# Patient Record
Sex: FEMALE | Race: WHITE | HISPANIC OR LATINO | Employment: OTHER | URBAN - METROPOLITAN AREA
[De-identification: names, ages, dates, MRNs, and addresses within clinical notes are randomized per-mention and may not be internally consistent; named-entity substitution may affect disease eponyms.]

---

## 2022-12-16 ENCOUNTER — HOSPITAL ENCOUNTER (OUTPATIENT)
Dept: GENERAL RADIOLOGY | Facility: HOSPITAL | Age: 67
Discharge: HOME | End: 2022-12-16

## 2022-12-16 ENCOUNTER — PRE-ADMISSION TESTING (OUTPATIENT)
Dept: PREADMISSION TESTING | Facility: HOSPITAL | Age: 67
End: 2022-12-16

## 2022-12-16 ENCOUNTER — TELEPHONE (OUTPATIENT)
Dept: ORTHOPEDIC SURGERY | Facility: HOSPITAL | Age: 67
End: 2022-12-16

## 2022-12-16 VITALS
OXYGEN SATURATION: 96 % | TEMPERATURE: 97.9 F | BODY MASS INDEX: 27.19 KG/M2 | DIASTOLIC BLOOD PRESSURE: 72 MMHG | HEART RATE: 80 BPM | WEIGHT: 144 LBS | RESPIRATION RATE: 18 BRPM | SYSTOLIC BLOOD PRESSURE: 152 MMHG | HEIGHT: 61 IN

## 2022-12-16 LAB
ALBUMIN SERPL-MCNC: 4.6 G/DL (ref 3.5–5.2)
ALBUMIN/GLOB SERPL: 2 G/DL
ALP SERPL-CCNC: 91 U/L (ref 39–117)
ALT SERPL W P-5'-P-CCNC: 15 U/L (ref 1–33)
ANION GAP SERPL CALCULATED.3IONS-SCNC: 12 MMOL/L (ref 5–15)
AST SERPL-CCNC: 17 U/L (ref 1–32)
BACTERIA UR QL AUTO: NORMAL /HPF
BILIRUB SERPL-MCNC: 0.3 MG/DL (ref 0–1.2)
BILIRUB UR QL STRIP: NEGATIVE
BUN SERPL-MCNC: 12 MG/DL (ref 8–23)
BUN/CREAT SERPL: 15.6 (ref 7–25)
CALCIUM SPEC-SCNC: 9.4 MG/DL (ref 8.6–10.5)
CHLORIDE SERPL-SCNC: 99 MMOL/L (ref 98–107)
CLARITY UR: CLEAR
CO2 SERPL-SCNC: 28 MMOL/L (ref 22–29)
COLOR UR: ABNORMAL
CREAT SERPL-MCNC: 0.77 MG/DL (ref 0.57–1)
DEPRECATED RDW RBC AUTO: 38.3 FL (ref 37–54)
EGFRCR SERPLBLD CKD-EPI 2021: 84.7 ML/MIN/1.73
ERYTHROCYTE [DISTWIDTH] IN BLOOD BY AUTOMATED COUNT: 11.8 % (ref 12.3–15.4)
GLOBULIN UR ELPH-MCNC: 2.3 GM/DL
GLUCOSE SERPL-MCNC: 159 MG/DL (ref 65–99)
GLUCOSE UR STRIP-MCNC: ABNORMAL MG/DL
HBA1C MFR BLD: 7.4 % (ref 4.8–5.6)
HCT VFR BLD AUTO: 37.7 % (ref 34–46.6)
HGB BLD-MCNC: 12.4 G/DL (ref 12–15.9)
HGB UR QL STRIP.AUTO: NEGATIVE
HYALINE CASTS UR QL AUTO: NORMAL /LPF
KETONES UR QL STRIP: NEGATIVE
LEUKOCYTE ESTERASE UR QL STRIP.AUTO: NEGATIVE
MCH RBC QN AUTO: 29.5 PG (ref 26.6–33)
MCHC RBC AUTO-ENTMCNC: 32.9 G/DL (ref 31.5–35.7)
MCV RBC AUTO: 89.8 FL (ref 79–97)
NITRITE UR QL STRIP: NEGATIVE
PH UR STRIP.AUTO: <=5 [PH] (ref 5–8)
PLATELET # BLD AUTO: 307 10*3/MM3 (ref 140–450)
PMV BLD AUTO: 10.1 FL (ref 6–12)
POTASSIUM SERPL-SCNC: 4 MMOL/L (ref 3.5–5.2)
PROT SERPL-MCNC: 6.9 G/DL (ref 6–8.5)
PROT UR QL STRIP: NEGATIVE
QT INTERVAL: 405 MS
RBC # BLD AUTO: 4.2 10*6/MM3 (ref 3.77–5.28)
RBC # UR STRIP: NORMAL /HPF
REF LAB TEST METHOD: NORMAL
SODIUM SERPL-SCNC: 139 MMOL/L (ref 136–145)
SP GR UR STRIP: 1.01 (ref 1–1.03)
SQUAMOUS #/AREA URNS HPF: NORMAL /HPF
UROBILINOGEN UR QL STRIP: ABNORMAL
WBC # UR STRIP: NORMAL /HPF
WBC NRBC COR # BLD: 7.47 10*3/MM3 (ref 3.4–10.8)

## 2022-12-16 PROCEDURE — 80053 COMPREHEN METABOLIC PANEL: CPT

## 2022-12-16 PROCEDURE — 71046 X-RAY EXAM CHEST 2 VIEWS: CPT

## 2022-12-16 PROCEDURE — 81001 URINALYSIS AUTO W/SCOPE: CPT

## 2022-12-16 PROCEDURE — 73560 X-RAY EXAM OF KNEE 1 OR 2: CPT

## 2022-12-16 PROCEDURE — 85027 COMPLETE CBC AUTOMATED: CPT

## 2022-12-16 PROCEDURE — 36415 COLL VENOUS BLD VENIPUNCTURE: CPT

## 2022-12-16 PROCEDURE — 83036 HEMOGLOBIN GLYCOSYLATED A1C: CPT

## 2022-12-16 PROCEDURE — 93010 ELECTROCARDIOGRAM REPORT: CPT | Performed by: INTERNAL MEDICINE

## 2022-12-16 PROCEDURE — 93005 ELECTROCARDIOGRAM TRACING: CPT

## 2022-12-16 RX ORDER — EMPAGLIFLOZIN, METFORMIN HYDROCHLORIDE 25; 1000 MG/1; MG/1
1 TABLET, EXTENDED RELEASE ORAL DAILY
COMMUNITY
Start: 2022-10-27

## 2022-12-16 RX ORDER — METOPROLOL SUCCINATE 100 MG/1
1 TABLET, EXTENDED RELEASE ORAL DAILY
COMMUNITY
Start: 2022-12-12

## 2022-12-16 RX ORDER — INDAPAMIDE 1.25 MG/1
1.25 TABLET, FILM COATED ORAL DAILY
COMMUNITY
Start: 2022-10-27

## 2022-12-16 RX ORDER — LETROZOLE 2.5 MG/1
2.5 TABLET, FILM COATED ORAL NIGHTLY
COMMUNITY
Start: 2022-10-03

## 2022-12-16 RX ORDER — CHLORHEXIDINE GLUCONATE 500 MG/1
CLOTH TOPICAL
COMMUNITY

## 2022-12-16 RX ORDER — ICOSAPENT ETHYL 1000 MG/1
2 CAPSULE ORAL 2 TIMES DAILY
COMMUNITY
Start: 2022-10-19

## 2022-12-16 RX ORDER — LISINOPRIL 20 MG/1
20 TABLET ORAL DAILY
COMMUNITY
Start: 2022-10-26

## 2022-12-16 RX ORDER — SIMVASTATIN 20 MG
TABLET ORAL NIGHTLY
COMMUNITY
Start: 2022-12-04

## 2022-12-16 RX ORDER — AMLODIPINE BESYLATE 5 MG/1
5 TABLET ORAL DAILY
COMMUNITY
Start: 2022-12-15

## 2022-12-16 NOTE — DISCHARGE INSTRUCTIONS
ARRIVE DAY OF SURGERY AS NOTIFIED FROM SURGERY CENTER          Take the following medications the morning of surgery:  ONLY  AMLODIPINE & METOPROLOL      If you are on prescription narcotic pain medication to control your pain you may also take that medication the morning of surgery.    General Instructions:  Do not eat solid food after midnight the night before surgery.  You may drink clear liquids day of surgery but must stop at least one hour before your hospital arrival time.  It is beneficial for you to have a clear drink that contains carbohydrates the day of surgery.  We suggest a 12 to 20 ounce bottle of Gatorade or Powerade for non-diabetic patients or a 12 to 20 ounce bottle of G2 or Powerade Zero for diabetic patients. (Pediatric patients, are not advised to drink a 12 to 20 ounce carbohydrate drink)    Clear liquids are liquids you can see through.  Nothing red in color.     Plain water                               Sports drinks  Sodas                                   Gelatin (Jell-O)  Fruit juices without pulp such as white grape juice and apple juice  Popsicles that contain no fruit or yogurt  Tea or coffee (no cream or milk added)  Gatorade / Powerade  G2 / Powerade Zero    Infants may have breast milk up to four hours before surgery.  Infants drinking formula may drink formula up to six hours before surgery.   Patients who avoid smoking, chewing tobacco and alcohol for 4 weeks prior to surgery have a reduced risk of post-operative complications.  Quit smoking as many days before surgery as you can.  Do not smoke, use chewing tobacco or drink alcohol the day of surgery.   If applicable bring your C-PAP/ BI-PAP machine.  Bring any papers given to you in the doctor’s office.  Wear clean comfortable clothes.  Do not wear contact lenses, false eyelashes or make-up.  Bring a case for your glasses.   Bring crutches or walker if applicable.  Remove all piercings.  Leave jewelry and any other valuables at  home.  Hair extensions with metal clips must be removed prior to surgery.  The Pre-Admission Testing nurse will instruct you to bring medications if unable to obtain an accurate list in Pre-Admission Testing.          Preventing a Surgical Site Infection:  For 2 to 3 days before surgery, avoid shaving with a razor because the razor can irritate skin and make it easier to develop an infection.    Any areas of open skin can increase the risk of a post-operative wound infection by allowing bacteria to enter and travel throughout the body.  Notify your surgeon if you have any skin wounds / rashes even if it is not near the expected surgical site.  The area will need assessed to determine if surgery should be delayed until it is healed.  The night prior to surgery shower using a fresh bar of anti-bacterial soap (such as Dial) and clean washcloth.  Sleep in a clean bed with clean clothing.  Do not allow pets to sleep with you.  Shower on the morning of surgery using a fresh bar of anti-bacterial soap (such as Dial) and clean washcloth.  Dry with a clean towel and dress in clean clothing.  Ask your surgeon if you will be receiving antibiotics prior to surgery.  Make sure you, your family, and all healthcare providers clean their hands with soap and water or an alcohol based hand  before caring for you or your wound.    Day of surgery:  Your arrival time is approximately two hours before your scheduled surgery time.  Upon arrival, a Pre-op nurse and Anesthesiologist will review your health history, obtain vital signs, and answer questions you may have.  The only belongings needed at this time will be a list of your home medications and if applicable your C-PAP/BI-PAP machine.  A Pre-op nurse will start an IV and you may receive medication in preparation for surgery, including something to help you relax.     Please be aware that surgery does come with discomfort.  We want to make every effort to control your  discomfort so please discuss any uncontrolled symptoms with your nurse.   Your doctor will most likely have prescribed pain medications.      If you are going home after surgery you will receive individualized written care instructions before being discharged.  A responsible adult must drive you to and from the hospital on the day of your surgery and stay with you for 24 hours.  Discharge prescriptions can be filled by the hospital pharmacy during regular pharmacy hours.  If you are having surgery late in the day/evening your prescription may be e-prescribed to your pharmacy.  Please verify your pharmacy hours or chose a 24 hour pharmacy to avoid not having access to your prescription because your pharmacy has closed for the day.    If you are staying overnight following surgery, you will be transported to your hospital room following the recovery period.  [facility] has all private rooms.    If you have any questions please call Pre-Admission Testing at [phone].  Deductibles and co-payments are collected on the day of service. Please be prepared to pay the required co-pay, deductible or deposit on the day of service as defined by your plan.    Call your surgeon immediately if you experience any of the following symptoms:  Sore Throat  Shortness of Breath or difficulty breathing  Cough  Chills  Body soreness or muscle pain  Headache  Fever  New loss of taste or smell  Do not arrive for your surgery ill.  Your procedure will need to be rescheduled to another time.  You will need to call your physician before the day of surgery to avoid any unnecessary exposure to hospital staff as well as other patients.           CHLORHEXIDINE CLOTH INSTRUCTIONS  The morning of surgery follow these instructions using the Chlorhexidine cloths you've been given.  These steps reduce bacteria on the body.  Do not use the cloths near your eyes, ears mouth, genitalia or on open wounds.  Throw the cloths away after use  but do not try to flush them down a toilet.      Open and remove one cloth at a time from the package.    Leave the cloth unfolded and begin the bathing.  Massage the skin with the cloths using gentle pressure to remove bacteria.  Do not scrub harshly.   Follow the steps below with one 2% CHG cloth per area (6 total cloths).  One cloth for neck, shoulders and chest.  One cloth for both arms, hands, fingers and underarms (do underarms last).  One cloth for the abdomen followed by groin.  One cloth for right leg and foot including between the toes.  One cloth for left leg and foot including between the toes.  The last cloth is to be used for the back of the neck, back and buttocks.    Allow the CHG to air dry 3 minutes on the skin which will give it time to work and decrease the chance of irritation.  The skin may feel sticky until it is dry.  Do not rinse with water or any other liquid or you will lose the beneficial effects of the CHG.  If mild skin irritation occurs, do rinse the skin to remove the CHG.  Report this to the nurse at time of admission.  Do not apply lotions, creams, ointments, deodorants or perfumes after using the clothes. Dress in clean clothes before coming to the hospital.

## 2022-12-16 NOTE — TELEPHONE ENCOUNTER
Spoke with Ms. Murguia to see if she would be interested in going home Monday after her procedure. She said she has spoken with the MD regarding this and she would rather stay overnight because of her DM. Patient to be an overnight stay at this time.

## 2022-12-26 ENCOUNTER — HOSPITAL ENCOUNTER (OUTPATIENT)
Facility: HOSPITAL | Age: 67
Discharge: HOME HEALTH | End: 2022-12-27
Attending: ORTHOPAEDIC SURGERY | Admitting: ORTHOPAEDIC SURGERY

## 2022-12-26 ENCOUNTER — ANESTHESIA EVENT (OUTPATIENT)
Dept: PERIOP | Facility: HOSPITAL | Age: 67
End: 2022-12-26

## 2022-12-26 ENCOUNTER — ANESTHESIA (OUTPATIENT)
Dept: PERIOP | Facility: HOSPITAL | Age: 67
End: 2022-12-26

## 2022-12-26 ENCOUNTER — APPOINTMENT (OUTPATIENT)
Dept: GENERAL RADIOLOGY | Facility: HOSPITAL | Age: 67
End: 2022-12-26

## 2022-12-26 DIAGNOSIS — Z96.659 STATUS POST KNEE REPLACEMENT, UNSPECIFIED LATERALITY: Primary | ICD-10-CM

## 2022-12-26 LAB
GLUCOSE BLDC GLUCOMTR-MCNC: 162 MG/DL (ref 70–130)
GLUCOSE BLDC GLUCOMTR-MCNC: 179 MG/DL (ref 70–130)

## 2022-12-26 PROCEDURE — 25010000002 HYDROMORPHONE 1 MG/ML SOLUTION: Performed by: NURSE ANESTHETIST, CERTIFIED REGISTERED

## 2022-12-26 PROCEDURE — 63710000001 FAMOTIDINE 20 MG TABLET: Performed by: ORTHOPAEDIC SURGERY

## 2022-12-26 PROCEDURE — A9270 NON-COVERED ITEM OR SERVICE: HCPCS | Performed by: ORTHOPAEDIC SURGERY

## 2022-12-26 PROCEDURE — 25010000002 CLONIDINE PER 1 MG: Performed by: ORTHOPAEDIC SURGERY

## 2022-12-26 PROCEDURE — 25010000002 FENTANYL CITRATE (PF) 100 MCG/2ML SOLUTION: Performed by: NURSE ANESTHETIST, CERTIFIED REGISTERED

## 2022-12-26 PROCEDURE — C1713 ANCHOR/SCREW BN/BN,TIS/BN: HCPCS | Performed by: ORTHOPAEDIC SURGERY

## 2022-12-26 PROCEDURE — 63710000001 HYDROCODONE-ACETAMINOPHEN 7.5-325 MG TABLET: Performed by: ORTHOPAEDIC SURGERY

## 2022-12-26 PROCEDURE — 25010000002 EPINEPHRINE 1 MG/ML SOLUTION 30 ML VIAL: Performed by: ORTHOPAEDIC SURGERY

## 2022-12-26 PROCEDURE — C1776 JOINT DEVICE (IMPLANTABLE): HCPCS | Performed by: ORTHOPAEDIC SURGERY

## 2022-12-26 PROCEDURE — 25010000002 PROPOFOL 10 MG/ML EMULSION: Performed by: NURSE ANESTHETIST, CERTIFIED REGISTERED

## 2022-12-26 PROCEDURE — 63710000001 AMLODIPINE 5 MG TABLET: Performed by: ORTHOPAEDIC SURGERY

## 2022-12-26 PROCEDURE — 63710000001 LETROZOLE 2.5 MG TABLET: Performed by: ORTHOPAEDIC SURGERY

## 2022-12-26 PROCEDURE — 63710000001 MELOXICAM 15 MG TABLET: Performed by: ORTHOPAEDIC SURGERY

## 2022-12-26 PROCEDURE — 25010000002 ONDANSETRON PER 1 MG: Performed by: ORTHOPAEDIC SURGERY

## 2022-12-26 PROCEDURE — 73560 X-RAY EXAM OF KNEE 1 OR 2: CPT

## 2022-12-26 PROCEDURE — 63710000001 INDAPAMIDE 1.25 MG TABLET: Performed by: ORTHOPAEDIC SURGERY

## 2022-12-26 PROCEDURE — 25010000002 CEFAZOLIN IN DEXTROSE 2-4 GM/100ML-% SOLUTION: Performed by: ORTHOPAEDIC SURGERY

## 2022-12-26 PROCEDURE — 25010000002 KETOROLAC TROMETHAMINE PER 15 MG: Performed by: ORTHOPAEDIC SURGERY

## 2022-12-26 PROCEDURE — 25010000002 ROPIVACAINE PER 1 MG: Performed by: ORTHOPAEDIC SURGERY

## 2022-12-26 PROCEDURE — 25010000002 ONDANSETRON PER 1 MG: Performed by: NURSE ANESTHETIST, CERTIFIED REGISTERED

## 2022-12-26 PROCEDURE — G0378 HOSPITAL OBSERVATION PER HR: HCPCS

## 2022-12-26 PROCEDURE — 63710000001 LISINOPRIL 20 MG TABLET: Performed by: ORTHOPAEDIC SURGERY

## 2022-12-26 PROCEDURE — 82962 GLUCOSE BLOOD TEST: CPT

## 2022-12-26 DEVICE — JOURNEY II BCS XLPE ARTICULAR                                    INSERT SZ 1-2 LEFT 11MM
Type: IMPLANTABLE DEVICE | Site: KNEE | Status: FUNCTIONAL
Brand: JOURNEY

## 2022-12-26 DEVICE — CMT BONE PALACOS R HI/VISC 1X40: Type: IMPLANTABLE DEVICE | Site: KNEE | Status: FUNCTIONAL

## 2022-12-26 DEVICE — JOURNEY II BCS FEMORAL OXINIUM                                    LEFT SIZE 2
Type: IMPLANTABLE DEVICE | Site: KNEE | Status: FUNCTIONAL
Brand: JOURNEY

## 2022-12-26 DEVICE — JOURNEY 7.5 ROUND RESURF PAT 32MM STANDARD
Type: IMPLANTABLE DEVICE | Site: KNEE | Status: FUNCTIONAL
Brand: JOURNEY

## 2022-12-26 DEVICE — JOURNEY TIBIAL BASEPLATE NONPOROUS                                    LEFT SIZE 1
Type: IMPLANTABLE DEVICE | Site: KNEE | Status: FUNCTIONAL
Brand: JOURNEY

## 2022-12-26 DEVICE — IMPLANTABLE DEVICE: Type: IMPLANTABLE DEVICE | Site: KNEE | Status: FUNCTIONAL

## 2022-12-26 DEVICE — DEV CONTRL TISS STRATAFIX SPIRAL MNCRYL UD 3/0 PLS 30CM: Type: IMPLANTABLE DEVICE | Site: KNEE | Status: FUNCTIONAL

## 2022-12-26 RX ORDER — ONDANSETRON 4 MG/1
4 TABLET, FILM COATED ORAL EVERY 6 HOURS PRN
Status: DISCONTINUED | OUTPATIENT
Start: 2022-12-26 | End: 2022-12-27 | Stop reason: HOSPADM

## 2022-12-26 RX ORDER — HYDROCODONE BITARTRATE AND ACETAMINOPHEN 7.5; 325 MG/1; MG/1
2 TABLET ORAL EVERY 4 HOURS PRN
Status: DISCONTINUED | OUTPATIENT
Start: 2022-12-26 | End: 2022-12-27 | Stop reason: HOSPADM

## 2022-12-26 RX ORDER — LISINOPRIL 20 MG/1
20 TABLET ORAL DAILY
Status: DISCONTINUED | OUTPATIENT
Start: 2022-12-26 | End: 2022-12-27 | Stop reason: HOSPADM

## 2022-12-26 RX ORDER — PROMETHAZINE HYDROCHLORIDE 25 MG/1
25 SUPPOSITORY RECTAL ONCE AS NEEDED
Status: DISCONTINUED | OUTPATIENT
Start: 2022-12-26 | End: 2022-12-26 | Stop reason: HOSPADM

## 2022-12-26 RX ORDER — SUCCINYLCHOLINE/SOD CL,ISO/PF 200MG/10ML
SYRINGE (ML) INTRAVENOUS AS NEEDED
Status: DISCONTINUED | OUTPATIENT
Start: 2022-12-26 | End: 2022-12-26 | Stop reason: SURG

## 2022-12-26 RX ORDER — DIPHENHYDRAMINE HCL 25 MG
25 CAPSULE ORAL
Status: DISCONTINUED | OUTPATIENT
Start: 2022-12-26 | End: 2022-12-26 | Stop reason: HOSPADM

## 2022-12-26 RX ORDER — OXYCODONE HYDROCHLORIDE AND ACETAMINOPHEN 5; 325 MG/1; MG/1
1 TABLET ORAL EVERY 4 HOURS PRN
Status: DISCONTINUED | OUTPATIENT
Start: 2022-12-26 | End: 2022-12-26

## 2022-12-26 RX ORDER — PREGABALIN 75 MG/1
150 CAPSULE ORAL ONCE
Status: COMPLETED | OUTPATIENT
Start: 2022-12-26 | End: 2022-12-26

## 2022-12-26 RX ORDER — SODIUM CHLORIDE 9 MG/ML
100 INJECTION, SOLUTION INTRAVENOUS CONTINUOUS
Status: DISCONTINUED | OUTPATIENT
Start: 2022-12-26 | End: 2022-12-27 | Stop reason: HOSPADM

## 2022-12-26 RX ORDER — LABETALOL HYDROCHLORIDE 5 MG/ML
5 INJECTION, SOLUTION INTRAVENOUS
Status: DISCONTINUED | OUTPATIENT
Start: 2022-12-26 | End: 2022-12-26 | Stop reason: HOSPADM

## 2022-12-26 RX ORDER — CELECOXIB 200 MG/1
200 CAPSULE ORAL ONCE
Status: COMPLETED | OUTPATIENT
Start: 2022-12-26 | End: 2022-12-26

## 2022-12-26 RX ORDER — HYDRALAZINE HYDROCHLORIDE 20 MG/ML
5 INJECTION INTRAMUSCULAR; INTRAVENOUS
Status: DISCONTINUED | OUTPATIENT
Start: 2022-12-26 | End: 2022-12-26 | Stop reason: HOSPADM

## 2022-12-26 RX ORDER — ONDANSETRON 2 MG/ML
4 INJECTION INTRAMUSCULAR; INTRAVENOUS ONCE AS NEEDED
Status: DISCONTINUED | OUTPATIENT
Start: 2022-12-26 | End: 2022-12-26 | Stop reason: HOSPADM

## 2022-12-26 RX ORDER — NALOXONE HCL 0.4 MG/ML
0.1 VIAL (ML) INJECTION
Status: DISCONTINUED | OUTPATIENT
Start: 2022-12-26 | End: 2022-12-27 | Stop reason: HOSPADM

## 2022-12-26 RX ORDER — HYDROMORPHONE HYDROCHLORIDE 1 MG/ML
0.5 INJECTION, SOLUTION INTRAMUSCULAR; INTRAVENOUS; SUBCUTANEOUS
Status: DISCONTINUED | OUTPATIENT
Start: 2022-12-26 | End: 2022-12-26 | Stop reason: HOSPADM

## 2022-12-26 RX ORDER — DOCUSATE SODIUM 100 MG/1
100 CAPSULE, LIQUID FILLED ORAL 2 TIMES DAILY PRN
Status: DISCONTINUED | OUTPATIENT
Start: 2022-12-26 | End: 2022-12-27 | Stop reason: HOSPADM

## 2022-12-26 RX ORDER — MAGNESIUM HYDROXIDE 1200 MG/15ML
LIQUID ORAL AS NEEDED
Status: DISCONTINUED | OUTPATIENT
Start: 2022-12-26 | End: 2022-12-26 | Stop reason: HOSPADM

## 2022-12-26 RX ORDER — LIDOCAINE HYDROCHLORIDE 20 MG/ML
INJECTION, SOLUTION INTRAVENOUS AS NEEDED
Status: DISCONTINUED | OUTPATIENT
Start: 2022-12-26 | End: 2022-12-26 | Stop reason: SURG

## 2022-12-26 RX ORDER — HYDROCODONE BITARTRATE AND ACETAMINOPHEN 7.5; 325 MG/1; MG/1
1 TABLET ORAL EVERY 4 HOURS PRN
Status: DISCONTINUED | OUTPATIENT
Start: 2022-12-26 | End: 2022-12-27 | Stop reason: HOSPADM

## 2022-12-26 RX ORDER — FENTANYL CITRATE 50 UG/ML
50 INJECTION, SOLUTION INTRAMUSCULAR; INTRAVENOUS
Status: DISCONTINUED | OUTPATIENT
Start: 2022-12-26 | End: 2022-12-26 | Stop reason: HOSPADM

## 2022-12-26 RX ORDER — HYDROCODONE BITARTRATE AND ACETAMINOPHEN 7.5; 325 MG/1; MG/1
1 TABLET ORAL ONCE AS NEEDED
Status: DISCONTINUED | OUTPATIENT
Start: 2022-12-26 | End: 2022-12-26 | Stop reason: HOSPADM

## 2022-12-26 RX ORDER — DIPHENHYDRAMINE HYDROCHLORIDE 50 MG/ML
25 INJECTION INTRAMUSCULAR; INTRAVENOUS EVERY 6 HOURS PRN
Status: DISCONTINUED | OUTPATIENT
Start: 2022-12-26 | End: 2022-12-27 | Stop reason: HOSPADM

## 2022-12-26 RX ORDER — SODIUM CHLORIDE 0.9 % (FLUSH) 0.9 %
10 SYRINGE (ML) INJECTION EVERY 12 HOURS SCHEDULED
Status: DISCONTINUED | OUTPATIENT
Start: 2022-12-26 | End: 2022-12-27 | Stop reason: HOSPADM

## 2022-12-26 RX ORDER — DIPHENHYDRAMINE HCL 25 MG
50 CAPSULE ORAL EVERY 6 HOURS PRN
Status: DISCONTINUED | OUTPATIENT
Start: 2022-12-26 | End: 2022-12-27 | Stop reason: HOSPADM

## 2022-12-26 RX ORDER — NALOXONE HCL 0.4 MG/ML
0.2 VIAL (ML) INJECTION AS NEEDED
Status: DISCONTINUED | OUTPATIENT
Start: 2022-12-26 | End: 2022-12-26 | Stop reason: HOSPADM

## 2022-12-26 RX ORDER — FLUMAZENIL 0.1 MG/ML
0.2 INJECTION INTRAVENOUS AS NEEDED
Status: DISCONTINUED | OUTPATIENT
Start: 2022-12-26 | End: 2022-12-26 | Stop reason: HOSPADM

## 2022-12-26 RX ORDER — FAMOTIDINE 20 MG/1
40 TABLET, FILM COATED ORAL DAILY
Status: DISCONTINUED | OUTPATIENT
Start: 2022-12-26 | End: 2022-12-27 | Stop reason: HOSPADM

## 2022-12-26 RX ORDER — SODIUM CHLORIDE 0.9 % (FLUSH) 0.9 %
10 SYRINGE (ML) INJECTION AS NEEDED
Status: DISCONTINUED | OUTPATIENT
Start: 2022-12-26 | End: 2022-12-27 | Stop reason: HOSPADM

## 2022-12-26 RX ORDER — LETROZOLE 2.5 MG/1
2.5 TABLET, FILM COATED ORAL NIGHTLY
Status: DISCONTINUED | OUTPATIENT
Start: 2022-12-26 | End: 2022-12-27 | Stop reason: HOSPADM

## 2022-12-26 RX ORDER — UREA 10 %
1 LOTION (ML) TOPICAL NIGHTLY PRN
Status: DISCONTINUED | OUTPATIENT
Start: 2022-12-26 | End: 2022-12-27 | Stop reason: HOSPADM

## 2022-12-26 RX ORDER — CEFAZOLIN SODIUM 2 G/100ML
2 INJECTION, SOLUTION INTRAVENOUS ONCE
Status: COMPLETED | OUTPATIENT
Start: 2022-12-26 | End: 2022-12-26

## 2022-12-26 RX ORDER — INDAPAMIDE 1.25 MG/1
1.25 TABLET, FILM COATED ORAL DAILY
Status: DISCONTINUED | OUTPATIENT
Start: 2022-12-26 | End: 2022-12-27 | Stop reason: HOSPADM

## 2022-12-26 RX ORDER — EPHEDRINE SULFATE 50 MG/ML
5 INJECTION, SOLUTION INTRAVENOUS ONCE AS NEEDED
Status: DISCONTINUED | OUTPATIENT
Start: 2022-12-26 | End: 2022-12-26 | Stop reason: HOSPADM

## 2022-12-26 RX ORDER — DIPHENHYDRAMINE HYDROCHLORIDE 50 MG/ML
12.5 INJECTION INTRAMUSCULAR; INTRAVENOUS
Status: DISCONTINUED | OUTPATIENT
Start: 2022-12-26 | End: 2022-12-26 | Stop reason: HOSPADM

## 2022-12-26 RX ORDER — ASPIRIN 81 MG/1
81 TABLET ORAL EVERY 12 HOURS SCHEDULED
Status: DISCONTINUED | OUTPATIENT
Start: 2022-12-27 | End: 2022-12-27 | Stop reason: HOSPADM

## 2022-12-26 RX ORDER — PROPOFOL 10 MG/ML
VIAL (ML) INTRAVENOUS AS NEEDED
Status: DISCONTINUED | OUTPATIENT
Start: 2022-12-26 | End: 2022-12-26 | Stop reason: SURG

## 2022-12-26 RX ORDER — TRANEXAMIC ACID 100 MG/ML
INJECTION, SOLUTION INTRAVENOUS AS NEEDED
Status: DISCONTINUED | OUTPATIENT
Start: 2022-12-26 | End: 2022-12-26 | Stop reason: SURG

## 2022-12-26 RX ORDER — HYDROCODONE BITARTRATE AND ACETAMINOPHEN 7.5; 325 MG/1; MG/1
2 TABLET ORAL EVERY 4 HOURS PRN
Status: DISCONTINUED | OUTPATIENT
Start: 2022-12-26 | End: 2022-12-26 | Stop reason: HOSPADM

## 2022-12-26 RX ORDER — MELOXICAM 15 MG/1
15 TABLET ORAL DAILY
Status: DISCONTINUED | OUTPATIENT
Start: 2022-12-26 | End: 2022-12-27 | Stop reason: HOSPADM

## 2022-12-26 RX ORDER — ONDANSETRON 2 MG/ML
4 INJECTION INTRAMUSCULAR; INTRAVENOUS EVERY 6 HOURS PRN
Status: DISCONTINUED | OUTPATIENT
Start: 2022-12-26 | End: 2022-12-27 | Stop reason: HOSPADM

## 2022-12-26 RX ORDER — SODIUM CHLORIDE, SODIUM LACTATE, POTASSIUM CHLORIDE, CALCIUM CHLORIDE 600; 310; 30; 20 MG/100ML; MG/100ML; MG/100ML; MG/100ML
9 INJECTION, SOLUTION INTRAVENOUS CONTINUOUS PRN
Status: DISCONTINUED | OUTPATIENT
Start: 2022-12-26 | End: 2022-12-27 | Stop reason: HOSPADM

## 2022-12-26 RX ORDER — ONDANSETRON 2 MG/ML
INJECTION INTRAMUSCULAR; INTRAVENOUS AS NEEDED
Status: DISCONTINUED | OUTPATIENT
Start: 2022-12-26 | End: 2022-12-26 | Stop reason: SURG

## 2022-12-26 RX ORDER — OXYCODONE HYDROCHLORIDE AND ACETAMINOPHEN 5; 325 MG/1; MG/1
2 TABLET ORAL EVERY 4 HOURS PRN
Status: DISCONTINUED | OUTPATIENT
Start: 2022-12-26 | End: 2022-12-26

## 2022-12-26 RX ORDER — ACETAMINOPHEN 500 MG
1000 TABLET ORAL ONCE
Status: COMPLETED | OUTPATIENT
Start: 2022-12-26 | End: 2022-12-26

## 2022-12-26 RX ORDER — LIDOCAINE HYDROCHLORIDE 10 MG/ML
0.5 INJECTION, SOLUTION EPIDURAL; INFILTRATION; INTRACAUDAL; PERINEURAL ONCE AS NEEDED
Status: DISCONTINUED | OUTPATIENT
Start: 2022-12-26 | End: 2022-12-27 | Stop reason: HOSPADM

## 2022-12-26 RX ORDER — FENTANYL CITRATE 50 UG/ML
INJECTION, SOLUTION INTRAMUSCULAR; INTRAVENOUS AS NEEDED
Status: DISCONTINUED | OUTPATIENT
Start: 2022-12-26 | End: 2022-12-26 | Stop reason: SURG

## 2022-12-26 RX ORDER — CEFAZOLIN SODIUM 2 G/100ML
2 INJECTION, SOLUTION INTRAVENOUS EVERY 8 HOURS
Status: COMPLETED | OUTPATIENT
Start: 2022-12-26 | End: 2022-12-26

## 2022-12-26 RX ORDER — PROMETHAZINE HYDROCHLORIDE 25 MG/1
25 TABLET ORAL ONCE AS NEEDED
Status: DISCONTINUED | OUTPATIENT
Start: 2022-12-26 | End: 2022-12-26 | Stop reason: HOSPADM

## 2022-12-26 RX ORDER — MIDAZOLAM HYDROCHLORIDE 1 MG/ML
0.5 INJECTION INTRAMUSCULAR; INTRAVENOUS
Status: DISCONTINUED | OUTPATIENT
Start: 2022-12-26 | End: 2022-12-26 | Stop reason: HOSPADM

## 2022-12-26 RX ORDER — SODIUM CHLORIDE 9 MG/ML
40 INJECTION, SOLUTION INTRAVENOUS AS NEEDED
Status: DISCONTINUED | OUTPATIENT
Start: 2022-12-26 | End: 2022-12-27 | Stop reason: HOSPADM

## 2022-12-26 RX ORDER — METOPROLOL SUCCINATE 100 MG/1
100 TABLET, EXTENDED RELEASE ORAL DAILY
Status: DISCONTINUED | OUTPATIENT
Start: 2022-12-26 | End: 2022-12-27 | Stop reason: HOSPADM

## 2022-12-26 RX ORDER — AMLODIPINE BESYLATE 5 MG/1
5 TABLET ORAL DAILY
Status: DISCONTINUED | OUTPATIENT
Start: 2022-12-26 | End: 2022-12-27 | Stop reason: HOSPADM

## 2022-12-26 RX ADMIN — SODIUM CHLORIDE, POTASSIUM CHLORIDE, SODIUM LACTATE AND CALCIUM CHLORIDE 500 ML: 600; 310; 30; 20 INJECTION, SOLUTION INTRAVENOUS at 10:15

## 2022-12-26 RX ADMIN — PROPOFOL 150 MG: 10 INJECTION, EMULSION INTRAVENOUS at 08:21

## 2022-12-26 RX ADMIN — Medication 100 MG: at 08:22

## 2022-12-26 RX ADMIN — FENTANYL CITRATE 50 MCG: 50 INJECTION INTRAMUSCULAR; INTRAVENOUS at 08:21

## 2022-12-26 RX ADMIN — Medication 10 ML: at 22:47

## 2022-12-26 RX ADMIN — MELOXICAM 15 MG: 15 TABLET ORAL at 14:36

## 2022-12-26 RX ADMIN — ONDANSETRON 4 MG: 2 INJECTION INTRAMUSCULAR; INTRAVENOUS at 11:44

## 2022-12-26 RX ADMIN — CEFAZOLIN SODIUM 2 G: 2 INJECTION, SOLUTION INTRAVENOUS at 16:07

## 2022-12-26 RX ADMIN — PROPOFOL 50 MG: 10 INJECTION, EMULSION INTRAVENOUS at 09:15

## 2022-12-26 RX ADMIN — INDAPAMIDE 1.25 MG: 1.25 TABLET, FILM COATED ORAL at 14:36

## 2022-12-26 RX ADMIN — FENTANYL CITRATE 50 MCG: 50 INJECTION INTRAMUSCULAR; INTRAVENOUS at 08:40

## 2022-12-26 RX ADMIN — CEFAZOLIN SODIUM 2 G: 2 INJECTION, SOLUTION INTRAVENOUS at 23:07

## 2022-12-26 RX ADMIN — SODIUM CHLORIDE, POTASSIUM CHLORIDE, SODIUM LACTATE AND CALCIUM CHLORIDE 500 ML: 600; 310; 30; 20 INJECTION, SOLUTION INTRAVENOUS at 11:02

## 2022-12-26 RX ADMIN — TRANEXAMIC ACID 1000 MG: 1 INJECTION, SOLUTION INTRAVENOUS at 08:28

## 2022-12-26 RX ADMIN — HYDROCODONE BITARTRATE AND ACETAMINOPHEN 1 TABLET: 7.5; 325 TABLET ORAL at 22:46

## 2022-12-26 RX ADMIN — PREGABALIN 150 MG: 75 CAPSULE ORAL at 07:07

## 2022-12-26 RX ADMIN — LIDOCAINE HYDROCHLORIDE 80 MG: 20 INJECTION, SOLUTION INTRAVENOUS at 08:21

## 2022-12-26 RX ADMIN — AMLODIPINE BESYLATE 5 MG: 5 TABLET ORAL at 14:36

## 2022-12-26 RX ADMIN — CELECOXIB 200 MG: 200 CAPSULE ORAL at 07:07

## 2022-12-26 RX ADMIN — Medication 10 ML: at 11:45

## 2022-12-26 RX ADMIN — ONDANSETRON 4 MG: 2 INJECTION INTRAMUSCULAR; INTRAVENOUS at 09:15

## 2022-12-26 RX ADMIN — LETROZOLE 2.5 MG: 2.5 TABLET ORAL at 22:46

## 2022-12-26 RX ADMIN — HYDROMORPHONE HYDROCHLORIDE 0.5 MG: 1 INJECTION, SOLUTION INTRAMUSCULAR; INTRAVENOUS; SUBCUTANEOUS at 09:39

## 2022-12-26 RX ADMIN — SODIUM CHLORIDE, POTASSIUM CHLORIDE, SODIUM LACTATE AND CALCIUM CHLORIDE 9 ML/HR: 600; 310; 30; 20 INJECTION, SOLUTION INTRAVENOUS at 07:17

## 2022-12-26 RX ADMIN — SODIUM CHLORIDE 100 ML/HR: 9 INJECTION, SOLUTION INTRAVENOUS at 14:36

## 2022-12-26 RX ADMIN — ACETAMINOPHEN 1000 MG: 500 TABLET ORAL at 07:07

## 2022-12-26 RX ADMIN — LISINOPRIL 20 MG: 20 TABLET ORAL at 14:36

## 2022-12-26 RX ADMIN — CEFAZOLIN SODIUM 2 G: 2 INJECTION, SOLUTION INTRAVENOUS at 08:08

## 2022-12-26 RX ADMIN — FAMOTIDINE 40 MG: 20 TABLET, FILM COATED ORAL at 14:36

## 2022-12-26 NOTE — ANESTHESIA POSTPROCEDURE EVALUATION
Patient: Sisi Murguia    Procedure Summary     Date: 12/26/22 Room / Location: [facility] OR [address] [place] [address] [facility]    Anesthesia Start: 0812 Anesthesia Stop: 0946    Procedure: LEFT TOTAL KNEE ARTHROPLASTY WITH CORI ROBOT (Left: Knee) Diagnosis:     Surgeons: Ant Ocasio II, MD Provider: Chuy Gilliam MD    Anesthesia Type: general ASA Status: 3          Anesthesia Type: general    Vitals  Vitals Value Taken Time   /54 12/26/22 1030   Temp 36.7 °C (98 °F) 12/26/22 0945   Pulse 71 12/26/22 1032   Resp 18 12/26/22 1000   SpO2 100 % 12/26/22 1032   Vitals shown include unvalidated device data.        Post Anesthesia Care and Evaluation    Patient location during evaluation: bedside  Patient participation: complete - patient participated  Level of consciousness: awake  Pain management: adequate    Airway patency: patent  Anesthetic complications: No anesthetic complications  PONV Status: controlled  Cardiovascular status: acceptable  Respiratory status: acceptable  Hydration status: acceptable    Comments: --------------------            12/26/22               1015     --------------------   BP:                  Pulse:      82       Resp:                Temp:                SpO2:      97%      --------------------

## 2022-12-26 NOTE — CASE MANAGEMENT/SOCIAL WORK
Discharge Planning Assessment  [facility]     Patient Name: Sisi Murguia  MRN: [id number]  Today's Date: 12/26/2022    Admit Date: 12/26/2022    Plan: Home with family support & [facility].   Discharge Needs Assessment    No documentation.                Discharge Plan     Row Name 12/26/22 1659       Plan    Plan Home with family support & [facility].    Patient/Family in Agreement with Plan yes    Plan Comments Spoke with the patient, verified current information and explained the role of the CCP. Patient said she has family support. She plans to d/c home with family support & HH. Pt said her daughter/Mckenna will be staying with her while she recovers. Careplan received from [facility] which plans for [facility]. Discussed with the patient who's agreeable. Referral sent in [facility]. Patient plans for her family to transport her home at d/c. No other needs identified. CCP will follow.              Continued Care and Services - Admitted Since 12/26/2022     Home Medical Care     Service Provider Request Status Selected Services Address Phone Fax Patient Preferred    [facility] Pending - Request Sent N/A [address] [facility] [address] [phone] --                Parul NEIL, RN

## 2022-12-26 NOTE — OP NOTE
ROBOTIC Total Knee Replacement Operative Note  Dr. PIERO Ocasio II  [phone]    PATIENT NAME: Sisi Murguia  MRN: [id number]  : 1955 AGE: 67 y.o. GENDER: female  DATE OF OPERATION: 2022  PREOPERATIVE DIAGNOSIS: End Stage Arthritis  POSTOPERATIVE DIAGNOSIS: Same  OPERATION PERFORMED: Left Robotic Assisted Total Knee Arthroplasty  SURGEON: Ant Ocasio MD  Circulator: Hina Mackey RN; Rose Mary Gardiner RN  Scrub Person: Anuj Rowley  Vendor Representative: Chuy Reeder  Assistant: Jonas Bird CSA  ANESTHESIA: General  ASSISTANT: Abdiaziz Bird. This case would not have been possible without another set of skilled surgical hands for retraction, use of instrumentation, and general assistance.  This assistance was vital to the success of the case.   ESTIMATED BLOOD LOSS: 50cc  SPONGE AND NEEDLE COUNT: Correct  INDICATIONS:   A discussion of operative versus nonoperative treatment was had with the patient and they failed conservative management. They elected to undergo total knee arthroplasty. The risks of surgery were discussed and included the risk of anesthesia, infection, damage to neurovascular structures, implant loosening/failure, fracture, hardware prominence, continued pain, early failure, the need for further procedures, medical complications, and others. No guarantees were made. The patient wished to proceed with surgery and a surgical consent was signed.    COMPONENTS:   · Journey II BCS Oxinium Femoral Component: Size 2  · Journey II Tibial Baseplate: 1   · Posterior Stabilized Insert: 11  · Patella: 32mm    PERTINENT FINDINGS: Degenerative Arthritis    DETAILS OF PROCEDURE:  The patient was met in the preoperative area. The site was marked. The consent and H&P were reviewed. The patient was then wheeled back to the operative suite and transferred to the operative table. The patient underwent anesthesia. A tourniquet was placed on the upper thigh. Surgical alcohol  was used to thoroughly clean the entire operative extremity.     The leg was then prepped in the normal sterile fashion and surgical space suits were used for the entire operative team. New outer gloves were used by all sterile surgical team members after final draping. After a surgical timeout, the tourniquet was inflated.     I began by inserting 2 pins into the tibial and femoral shafts and attaching the tibial robotic .    In flexion, a midline knee incision was utilized centered on the patella and ending medial to the tibial tubercle. Dissection was carried down to the knee capsule. A medial parapatellar ararthrotomy was completed. The patellar fat pad was excised. The MCL was minimally elevated to gain adequate exposure to the knee.  The suprapatellar fat pad was also excised.  The patella was subluxed laterally. The patella was held vertical using 2 clamps, and was then cut using a saw. The patella was then sized, and the lug holes were drilled. Excess patellar bone was removed using a saw. The patella was then protected during this case using the metal patella shield.    The tibial and femoral guides were then attached to the pins.  I did utilize a femoral button but not a tibial button.    The ACL, meniscus, and PCL were then resected.  Next I proceeded with a standard mapping of the knee including all relevant anatomic positions, range of motion, and stressing of ligaments.  I then planned out the knee including implant sizes, rotation, and bony resection to appropriately balance the knee as needed.      After the mapping and planning was complete, I turned my attention to the distal femur.  Using the bur, I was able to remove the distal femoral bone and create the initial lug holes.  I then used the punch to create the pinholes for the 5-in-1 cutting block.  The 5-in-1 cutting block was then snapped into place and pinned.  I used a saw to resect the anterior posterior and chamfer cuts.  These were  all removed using a rongeur.    I then turned my attention to the tibia.  Retractors were placed appropriately.  Using the robot for guidance, a cutting jig was attached to the tibia using 2 pins.  This was done just above the level of measured resection.  I then used a saw to cut the tibia and remove this bone.  At that point, I was able to use the bur to resect down to the actual intended target tibial resection line.  This was verified to be accurate afterwards on the robot screen.    At that point, the remaining meniscus and osteophytes were removed.  I then attached the femoral component which was well-seated. The femoral BCS box was then prepared for.  I then trialed the knee and was noted to be in excellent balance with good range of motion.    We next turned our attention back to the tibia to finish the tibial preparation. The tibia was measured and sized. The tibial plate was aligned with the rotation from the trialing process and verified to be positioned near the medial third of the tibial tubercle. The tibial surface was then prepared for the keel.     The knee was thoroughly irrigated with sterile saline using a pulse-lavage system while the final tibial baseplate, femoral component and patellar component were opened. Cement was prepared and mixed using standard techniques. Outer gloves were changed before implant handling to ensure no soft tissue or oily material was exposed to the surfaces of the final implants. The bony knee surfaces were dried and the implants were cemented in place, starting with the tibia, then the femur and finally the patella. Excess cement was removed at each step. A trial poly was utilized during cementation for compression. The tourniquet was taken down and adequate hemostasis was achieved. The knee was thoroughly irrigated once again.     The soft tissues about the knee were then injected with an anesthetic cocktail. Care was taken to avoid the peroneal nerve and the  "neurovascular bundle posteriorly. The cement was allowed to harden.  While the cement was hardening, I did remove all 4 pins and the and femoral button.  The tibial and femoral pin sites were closed.    After the cement was fully set, the knee was ranged with various thickness of polyethylene trials to ensure that the balance was appropriate after robotic resection. The knee was inspected for excess cement, which was removed. The real poly, of corresponding thickness was then opened and inserted into the knee. One final range of motion and stability test showed the knee to be in good condition with a well tracking patellar component.    The knee capsule was then closed with a running barbed suture as well as interrupted Ethibond sutures. The knee was then closed in layers.  A sterile dressing was applied.    The patient was awoken from anesthesia, moved to the [facility] and taken to the recovery room in stable condition. Sponge and needle count were correct. There were no complications. Patient tolerated the procedure well.    R \"Xavier\" Ninfa LOVE MD  Orthopaedic Surgery  [facility]  [phone]                  "

## 2022-12-26 NOTE — ANESTHESIA PROCEDURE NOTES
Airway  Urgency: elective    Date/Time: 12/26/2022 8:25 AM  Airway not difficult    General Information and Staff    Patient location during procedure: OR  CRNA/CAA: Luba Campos CRNA    Indications and Patient Condition    Preoxygenated: yes  Mask difficulty assessment: 1 - vent by mask    Final Airway Details  Final airway type: endotracheal airway      Successful airway: ETT  Cuffed: yes   Successful intubation technique: direct laryngoscopy  Endotracheal tube insertion site: oral  Blade: Linda  Blade size: 3  ETT size (mm): 6.5  Cormack-Lehane Classification: grade I - full view of glottis  Placement verified by: chest auscultation and capnometry   Measured from: teeth  ETT/EBT  to teeth (cm): 21  Number of attempts at approach: 1  Assessment: lips, teeth, and gum same as pre-op and atraumatic intubation    Additional Comments  Teeth, tongue, lips, and gums in preop condition. VSS throughout. Easy mask/easy airway. Tube visualized through cords. Smaller tube used per pt.

## 2022-12-26 NOTE — DISCHARGE PLACEMENT REQUEST
"Sisi Ortega (67 y.o. Female)     Date of Birth   1955    Social Security Number       Address   [address] [place] [address] [address] [place] [address]    Home Phone   [phone]    MRN   [id number]       Rastafari   Patient Refused    Marital Status                               Admission Date   12/26/22    Admission Type   Elective    Admitting Provider   Ant Ocasio II, MD    Attending Provider   Ant Ocasio II, MD    Department, Room/Bed   [address], [facility]/1       Discharge Date       Discharge Disposition       Discharge Destination                               Attending Provider: Ant Ocasio II, MD    Allergies: Bee Venom, Hydrochlorothiazide, Rosuvastatin, Morphine, Oxycodone    Isolation: None   Infection: None   Code Status: CPR    Ht: 154.9 cm (61\")   Wt: 65.3 kg (144 lb)    Admission Cmt: None   Principal Problem: Status post knee replacement [Z96.659]                 Active Insurance as of 12/26/2022     Primary Coverage     Payor Plan Insurance Group Employer/Plan Group    HUMANA MEDICARE REPLACEMENT HUMANA MEDICARE REPLACEMENT [id number]     Payor Plan Address Payor Plan Phone Number Payor Plan Fax Number Effective Dates    [address] [address] [phone]  3/1/2020 - None Entered    [facility] [id number]       Subscriber Name Subscriber Birth Date Member ID       SISI ORTEGA 1955 [id number]                 Emergency Contacts      (Rel.) Home Phone Work Phone Mobile Phone    DANAY ADRIAN -- -- [phone]              "

## 2022-12-26 NOTE — ANESTHESIA PREPROCEDURE EVALUATION
Anesthesia Evaluation     Patient summary reviewed and Nursing notes reviewed   NPO Solid Status: > 8 hours             Airway   Mallampati: II  TM distance: >3 FB  Neck ROM: full  Possible difficult intubation  Dental - normal exam   (+) upper dentures and lower dentures    Pulmonary - negative pulmonary ROS and normal exam   Cardiovascular - normal exam    (+) hypertension,       Neuro/Psych- negative ROS  GI/Hepatic/Renal/Endo    (+)   diabetes mellitus,     Musculoskeletal (-) negative ROS    Abdominal  - normal exam   Substance History - negative use     OB/GYN negative ob/gyn ROS         Other                      Anesthesia Plan    ASA 3     general     Reason for not using neuraxial anesthesia or peripheral nerve block: Surgeon Refused  (Hx difficult intubation)  intravenous induction     Anesthetic plan, risks, benefits, and alternatives have been provided, discussed and informed consent has been obtained with: patient.    Plan discussed with CRNA.        CODE STATUS:

## 2022-12-26 NOTE — H&P
Orthopaedic Surgery  History & Physical For Elective Total Knee  Dr. PIERO Ocasio II  [phone]    HPI:  Patient is a 67 y.o.  or  female who presents with End-stage arthritis of the left knee. They failed conservative treatment of their knee pain and a thorough discussion of the risks and benefits of surgery was had. The patient wishes to continue with elective total knee replacement, they were scheduled and are here for surgery. They did get medical clearance as well as a thorough preoperative workup.    MEDICAL HISTORY  Past Medical History:   Diagnosis Date   • Diabetes mellitus (HCC)    • History of breast cancer 06/2019   • History of radiation therapy 2019   • History of TB (tuberculosis)     CARRIER, TESTS POSITIVE. NEVER ACTIVE, COMPLETED ORAL TX AND HAS HAD CXR   • History of transfusion 1988    2 UNITS AFTER CHILDBIRTH   • Hyperlipidemia    • Hypertension    • Left knee pain    • Osteoarthritis    • Sleep walking    ·   Past Surgical History:   Procedure Laterality Date   • BREAST LUMPECTOMY Right    • FOOT SURGERY Right     2 INCH SPLINTER REMOVAL   • LAPAROSCOPIC CHOLECYSTECTOMY     • LYMPH NODE BIOPSY Right     AXILLARY   • NASAL SINUS SURGERY     • ROTATOR CUFF REPAIR Right    ·   Prior to Admission medications    Medication Sig Start Date End Date Taking? Authorizing Provider   amLODIPine (NORVASC) 5 MG tablet Take 5 mg by mouth Daily. 12/15/22   Meliza Olson MD   Chlorhexidine Gluconate Cloth 2 % pads Apply  topically. AS DIRECTED PREOP    ProviderMeliza MD   Flaxseed, Linseed, (FLAXSEED OIL PO) Take  by mouth Daily.    Meliza Olson MD   indapamide (LOZOL) 1.25 MG tablet Take 1.25 mg by mouth Daily. 10/27/22   Meliza Olson MD   letrozole (FEMARA) 2.5 MG tablet Take 2.5 mg by mouth Every Night. 10/3/22   Meliza Olson MD   lisinopril (PRINIVIL,ZESTRIL) 20 MG tablet Take 20 mg by mouth Daily. 10/26/22   Meliza Olson MD  "  metoprolol succinate XL (TOPROL-XL) 100 MG 24 hr tablet Take 1 tablet by mouth Daily. 12/12/22   Meliza Olson MD   simvastatin (ZOCOR) 20 MG tablet Every Night. 12/4/22   Meliza Olson MD   Synjardy XR  MG tablet sustained-release 24 hour Take 1 tablet by mouth Daily. 10/27/22   Meliza Olson MD   Vascepa 1 g capsule capsule Take 2 capsules by mouth 2 (Two) Times a Day. 10/19/22   Meliza Olson MD   ·   Allergies   Allergen Reactions   • Bee Venom Anaphylaxis   • Hydrochlorothiazide Hives   • Rosuvastatin Hives   • Morphine Other (See Comments)     Patient refuses morphine     • Oxycodone Nausea Only     \"PERCOCET\"   ·   Most Recent Immunizations   Administered Date(s) Administered   • COVID-19 (PFIZER) BIVALENT BOOSTER 12+YRS 10/26/2022   • COVID-19 (PFIZER) PURPLE CAP 10/26/2021   ·   Social History     Tobacco Use   • Smoking status: Not on file     Passive exposure: Past   • Smokeless tobacco: Never   Substance Use Topics   • Alcohol use: Not Currently   ·    Social History     Substance and Sexual Activity   Drug Use Never   ·     REVIEW OF SYSTEMS:  · Head: negative for headache  · Respiratory: negative for shortness of breath.   · Cardiovascular: negative for chest pain.   · Gastrointestinal: negative abdominal pain.   · Neurological: negative for LOC  · Psychiatric/Behavioral: negative for memory loss.   · All other systems reviewed and are negative    VITALS: There were no vitals taken for this visit. There is no height or weight on file to calculate BMI.    PHYSICAL EXAM:   · CONSTITUTIONAL: A&Ox3, No acute distress  · LUNGS: Equal chest rise, no shortness of air  · CARDIOVASCULAR: palpable peripheral pulses  · SKIN: no skin lesions in the area examined  · LYMPH: no lymphadenopathy in the area examined  · EXTREMITY: Knee  · Pulses:  Brisk Capillary Refill  · Sensation: Intact to Saphenous, Sural, Deep Peroneal, Superficial Peroneal, and Tibial Nerves and grossly " throughout extremity  · Motor: 5/5 EHL/FHL/TA/GS motor complexes    RADIOLOGY REVIEW:   No radiology results for the last 7 days    LABS:   Results for the past 24 hours: No results found for this or any previous visit (from the past 24 hour(s)).    IMPRESSION:  Patient is a 67 y.o.  or  female with end-stage arthritis of the left knee    PLAN:   · Surgery: Elective total knee arthroplasty  · Consent: The risks and benefits of operative versus nonoperative treatment were discussed. The patient elected to undergo operative treatment of their knee arthritis. The risks discussed included but were not limited to blood clots, MI, stroke, other medical complications, infection, damage to neurovascular structures, continued pain, hardware prominence, loss of range of motion, need for further procedures, and and risk of anesthesia..  No guarantees were made   · Disposition: Elective left Total Knee Arthroplasty today.    Ant Ocasio II, MD  Orthopaedic Surgery  [facility]

## 2022-12-26 NOTE — PLAN OF CARE
Problem: Adult Inpatient Plan of Care  Goal: Absence of Hospital-Acquired Illness or Injury  Intervention: Identify and Manage Fall Risk  Recent Flowsheet Documentation  Taken 12/26/2022 1806 by Mer Matos RN  Safety Promotion/Fall Prevention:   activity supervised   assistive device/personal items within reach   clutter free environment maintained   fall prevention program maintained   safety round/check completed  Taken 12/26/2022 1608 by Mer Matos RN  Safety Promotion/Fall Prevention:   activity supervised   assistive device/personal items within reach   clutter free environment maintained   fall prevention program maintained   safety round/check completed  Taken 12/26/2022 1443 by Mer Matos RN  Safety Promotion/Fall Prevention:   activity supervised   assistive device/personal items within reach   clutter free environment maintained   fall prevention program maintained   safety round/check completed  Taken 12/26/2022 1200 by Mer Matos RN  Safety Promotion/Fall Prevention:   activity supervised   assistive device/personal items within reach   clutter free environment maintained   fall prevention program maintained   safety round/check completed  Taken 12/26/2022 1140 by Mer Matos RN  Safety Promotion/Fall Prevention:   activity supervised   assistive device/personal items within reach   clutter free environment maintained   fall prevention program maintained   gait belt   nonskid shoes/slippers when out of bed   safety round/check completed     Problem: Adult Inpatient Plan of Care  Goal: Absence of Hospital-Acquired Illness or Injury  Intervention: Prevent and Manage VTE (Venous Thromboembolism) Risk  Recent Flowsheet Documentation  Taken 12/26/2022 1806 by Mer Matos RN  Activity Management: up in chair  Taken 12/26/2022 1140 by Mer Matos RN  Activity Management:   activity adjusted per tolerance   activity encouraged   back to bed   dorsiflexion/plantar flexion performed   sitting, edge of  bed   up to bedside commode   standing at bedside   ambulated in room  VTE Prevention/Management:   bilateral   sequential compression devices on  Range of Motion: active ROM (range of motion) encouraged   Goal Outcome Evaluation:

## 2022-12-27 VITALS
DIASTOLIC BLOOD PRESSURE: 68 MMHG | HEART RATE: 63 BPM | TEMPERATURE: 98.2 F | WEIGHT: 144 LBS | OXYGEN SATURATION: 95 % | SYSTOLIC BLOOD PRESSURE: 128 MMHG | BODY MASS INDEX: 27.19 KG/M2 | RESPIRATION RATE: 16 BRPM | HEIGHT: 61 IN

## 2022-12-27 LAB
ANION GAP SERPL CALCULATED.3IONS-SCNC: 11.8 MMOL/L (ref 5–15)
BUN SERPL-MCNC: 18 MG/DL (ref 8–23)
BUN/CREAT SERPL: 22 (ref 7–25)
CALCIUM SPEC-SCNC: 7.8 MG/DL (ref 8.6–10.5)
CHLORIDE SERPL-SCNC: 100 MMOL/L (ref 98–107)
CO2 SERPL-SCNC: 25.2 MMOL/L (ref 22–29)
CREAT SERPL-MCNC: 0.82 MG/DL (ref 0.57–1)
DEPRECATED RDW RBC AUTO: 38.4 FL (ref 37–54)
EGFRCR SERPLBLD CKD-EPI 2021: 78.5 ML/MIN/1.73
ERYTHROCYTE [DISTWIDTH] IN BLOOD BY AUTOMATED COUNT: 11.6 % (ref 12.3–15.4)
GLUCOSE SERPL-MCNC: 149 MG/DL (ref 65–99)
HCT VFR BLD AUTO: 29.7 % (ref 34–46.6)
HGB BLD-MCNC: 9.6 G/DL (ref 12–15.9)
MCH RBC QN AUTO: 29.3 PG (ref 26.6–33)
MCHC RBC AUTO-ENTMCNC: 32.3 G/DL (ref 31.5–35.7)
MCV RBC AUTO: 90.5 FL (ref 79–97)
PLATELET # BLD AUTO: 237 10*3/MM3 (ref 140–450)
PMV BLD AUTO: 10.5 FL (ref 6–12)
POTASSIUM SERPL-SCNC: 3.8 MMOL/L (ref 3.5–5.2)
RBC # BLD AUTO: 3.28 10*6/MM3 (ref 3.77–5.28)
SODIUM SERPL-SCNC: 137 MMOL/L (ref 136–145)
WBC NRBC COR # BLD: 8.44 10*3/MM3 (ref 3.4–10.8)

## 2022-12-27 PROCEDURE — 97161 PT EVAL LOW COMPLEX 20 MIN: CPT

## 2022-12-27 PROCEDURE — A9270 NON-COVERED ITEM OR SERVICE: HCPCS | Performed by: ORTHOPAEDIC SURGERY

## 2022-12-27 PROCEDURE — 63710000001 ASPIRIN 81 MG TABLET DELAYED-RELEASE: Performed by: ORTHOPAEDIC SURGERY

## 2022-12-27 PROCEDURE — G0378 HOSPITAL OBSERVATION PER HR: HCPCS

## 2022-12-27 PROCEDURE — 80048 BASIC METABOLIC PNL TOTAL CA: CPT | Performed by: ORTHOPAEDIC SURGERY

## 2022-12-27 PROCEDURE — 63710000001 INDAPAMIDE 1.25 MG TABLET: Performed by: ORTHOPAEDIC SURGERY

## 2022-12-27 PROCEDURE — 85027 COMPLETE CBC AUTOMATED: CPT | Performed by: ORTHOPAEDIC SURGERY

## 2022-12-27 PROCEDURE — 63710000001 AMLODIPINE 5 MG TABLET: Performed by: ORTHOPAEDIC SURGERY

## 2022-12-27 PROCEDURE — 97110 THERAPEUTIC EXERCISES: CPT

## 2022-12-27 PROCEDURE — 63710000001 FAMOTIDINE 20 MG TABLET: Performed by: ORTHOPAEDIC SURGERY

## 2022-12-27 PROCEDURE — 63710000001 LISINOPRIL 20 MG TABLET: Performed by: ORTHOPAEDIC SURGERY

## 2022-12-27 PROCEDURE — 63710000001 MELOXICAM 15 MG TABLET: Performed by: ORTHOPAEDIC SURGERY

## 2022-12-27 PROCEDURE — 63710000001 HYDROCODONE-ACETAMINOPHEN 7.5-325 MG TABLET: Performed by: ORTHOPAEDIC SURGERY

## 2022-12-27 PROCEDURE — 63710000001 METOPROLOL SUCCINATE XL 100 MG TABLET SUSTAINED-RELEASE 24 HOUR: Performed by: ORTHOPAEDIC SURGERY

## 2022-12-27 RX ORDER — HYDROCODONE BITARTRATE AND ACETAMINOPHEN 7.5; 325 MG/1; MG/1
1 TABLET ORAL EVERY 4 HOURS PRN
Qty: 30 TABLET | Refills: 0 | Status: SHIPPED | OUTPATIENT
Start: 2022-12-27

## 2022-12-27 RX ORDER — ASPIRIN 81 MG/1
81 TABLET ORAL EVERY 12 HOURS
Qty: 60 TABLET | Refills: 0 | Status: SHIPPED | OUTPATIENT
Start: 2022-12-27 | End: 2023-01-26

## 2022-12-27 RX ADMIN — METOPROLOL SUCCINATE 100 MG: 100 TABLET, EXTENDED RELEASE ORAL at 08:12

## 2022-12-27 RX ADMIN — ASPIRIN 81 MG: 81 TABLET, COATED ORAL at 08:12

## 2022-12-27 RX ADMIN — FAMOTIDINE 40 MG: 20 TABLET, FILM COATED ORAL at 08:12

## 2022-12-27 RX ADMIN — LISINOPRIL 20 MG: 20 TABLET ORAL at 08:12

## 2022-12-27 RX ADMIN — HYDROCODONE BITARTRATE AND ACETAMINOPHEN 2 TABLET: 7.5; 325 TABLET ORAL at 08:12

## 2022-12-27 RX ADMIN — Medication 10 ML: at 08:20

## 2022-12-27 RX ADMIN — MELOXICAM 15 MG: 15 TABLET ORAL at 08:11

## 2022-12-27 RX ADMIN — INDAPAMIDE 1.25 MG: 1.25 TABLET, FILM COATED ORAL at 08:12

## 2022-12-27 RX ADMIN — AMLODIPINE BESYLATE 5 MG: 5 TABLET ORAL at 08:12

## 2022-12-27 NOTE — DISCHARGE SUMMARY
Orthopaedic Discharge Summary  Dr. PIERO Mullins” Ninfa II  [phone]    NAME: Sisi Blade PCP: Pretty Ronquillo APRN   :  MRN: [id number]  [id number] LOS:  ADMIT: 0 days  2022   AGE/SEX: 67 y.o. female DC:  today             · Admitting Diagnosis: Status post knee replacement [Z96.659]    · Surgery Performed: [place] TOTAL KNEE ARTHROPLASTY [61176] (LEFT TOTAL KNEE ARTHROPLASTY WITH CORI ROBOT)    · Discharge Medications:         Discharge Medications      New Medications      Instructions Start Date   aspirin 81 MG EC tablet   81 mg, Oral, Every 12 Hours      HYDROcodone-acetaminophen 7.5-325 MG per tablet  Commonly known as: Norco   1 tablet, Oral, Every 4 Hours PRN         Continue These Medications      Instructions Start Date   amLODIPine 5 MG tablet  Commonly known as: NORVASC   5 mg, Oral, Daily      Chlorhexidine Gluconate Cloth 2 % pads   Apply externally, AS DIRECTED PREOP      FLAXSEED OIL PO   Oral, Daily      indapamide 1.25 MG tablet  Commonly known as: LOZOL   1.25 mg, Oral, Daily      letrozole 2.5 MG tablet  Commonly known as: FEMARA   2.5 mg, Oral, Nightly      lisinopril 20 MG tablet  Commonly known as: PRINIVIL,ZESTRIL   20 mg, Oral, Daily      metoprolol succinate  MG 24 hr tablet  Commonly known as: TOPROL-XL   1 tablet, Oral, Daily      simvastatin 20 MG tablet  Commonly known as: ZOCOR   Nightly      Synjardy XR  MG tablet sustained-release 24 hour  Generic drug: Empagliflozin-metFORMIN HCl ER   1 tablet, Oral, Daily      Vascepa 1 g capsule capsule  Generic drug: icosapent ethyl   2 capsules, Oral, 2 Times Daily             · Vitals:     Vitals:    22 2142 22 0130 22 0536 22 0816   BP: 97/58 98/59 105/66 128/68   BP Location: Left arm Right arm Right arm Left arm   Patient Position: Lying Lying Lying Lying   Pulse: 66 61 63    Resp: 16 16 16    Temp: 97.6 °F (36.4 °C) 97.6 °F (36.4 °C) 98.2 °F (36.8 °C)    TempSrc: Oral Oral Oral    SpO2: 98%  97% 95%    Weight:       Height:           · Labs:      Admission on 12/26/2022, Discharged on 12/27/2022   Component Date Value Ref Range Status   • Glucose 12/26/2022 162 (H)  70 - 130 mg/dL Final    Meter: [id number] : [id number] Monik Méndez RN   • Glucose 12/26/2022 179 (H)  70 - 130 mg/dL Final    Meter: [id number] : [id number] Mayra Dominguez RN   • Glucose 12/27/2022 149 (H)  65 - 99 mg/dL Final   • BUN 12/27/2022 18  8 - 23 mg/dL Final   • Creatinine 12/27/2022 0.82  0.57 - 1.00 mg/dL Final   • Sodium 12/27/2022 137  136 - 145 mmol/L Final   • Potassium 12/27/2022 3.8  3.5 - 5.2 mmol/L Final   • Chloride 12/27/2022 100  98 - 107 mmol/L Final   • CO2 12/27/2022 25.2  22.0 - 29.0 mmol/L Final   • Calcium 12/27/2022 7.8 (L)  8.6 - 10.5 mg/dL Final   • BUN/Creatinine Ratio 12/27/2022 22.0  7.0 - 25.0 Final   • Anion Gap 12/27/2022 11.8  5.0 - 15.0 mmol/L Final   • eGFR 12/27/2022 78.5  >60.0 mL/min/1.73 Final    National Kidney Foundation and American Society of Nephrology (ASN) Task Force recommended calculation based on the Chronic Kidney Disease Epidemiology Collaboration (CKD-EPI) equation refit without adjustment for race.   • WBC 12/27/2022 8.44  3.40 - 10.80 10*3/mm3 Final   • RBC 12/27/2022 3.28 (L)  3.77 - 5.28 10*6/mm3 Final   • Hemoglobin 12/27/2022 9.6 (L)  12.0 - 15.9 g/dL Final   • Hematocrit 12/27/2022 29.7 (L)  34.0 - 46.6 % Final   • MCV 12/27/2022 90.5  79.0 - 97.0 fL Final   • MCH 12/27/2022 29.3  26.6 - 33.0 pg Final   • MCHC 12/27/2022 32.3  31.5 - 35.7 g/dL Final   • RDW 12/27/2022 11.6 (L)  12.3 - 15.4 % Final   • RDW-SD 12/27/2022 38.4  37.0 - 54.0 fl Final   • MPV 12/27/2022 10.5  6.0 - 12.0 fL Final   • Platelets 12/27/2022 237  140 - 450 10*3/mm3 Final        No results found.    · Hospital Course:   67 y.o. female was admitted to [facility] to services of Ant Ocasio II, MD with Status post knee replacement [Z96.659] on 12/26/2022 and underwent [place] TOTAL  "KNEE ARTHROPLASTY [44986] (LEFT TOTAL KNEE ARTHROPLASTY WITH CORI ROBOT). Post-operatively the patient transferred to the floor where the patient underwent mobilization therapy. Opioids were titrated to achieve appropriate pain management to allow for participation in mobilization exercises. Vital signs and laboratory values are now within safe parameters for discharge. The dressings and/or incision is intact without signs or symptoms of active infection. Operative extremity neurovascular status remains intact as compared to the preoperative exam. Appropriate education re: incision care, activity levels, medications, and follow up visits was completed and all questions were answered. The patient is now deemed stable for discharge.    HOME: The patient progressed well with physical therapy. There were cleared for discharge to home. The amandaetn was sent home in good condition}.       R \"Xavier\" Ninfa LOVE MD  Orthopaedic Surgery  [facility]  [phone]                                               "

## 2022-12-27 NOTE — THERAPY DISCHARGE NOTE
Patient Name: Sisi Murguia  : 1955    MRN: [id number]                              Today's Date: 2022       Admit Date: 2022    Visit Dx:     ICD-10-CM ICD-9-CM   1. Status post knee replacement, unspecified laterality  Z96.659 V43.65     Patient Active Problem List   Diagnosis   • Status post knee replacement     Past Medical History:   Diagnosis Date   • Diabetes mellitus (HCC)    • History of breast cancer 2019   • History of radiation therapy    • History of TB (tuberculosis)     CARRIER, TESTS POSITIVE. NEVER ACTIVE, COMPLETED ORAL TX AND HAS HAD CXR   • History of transfusion 1988    2 UNITS AFTER CHILDBIRTH   • Hyperlipidemia    • Hypertension    • Left knee pain    • Osteoarthritis    • Sleep walking      Past Surgical History:   Procedure Laterality Date   • BREAST LUMPECTOMY Right    • FOOT SURGERY Right     2 INCH SPLINTER REMOVAL   • LAPAROSCOPIC CHOLECYSTECTOMY     • LYMPH NODE BIOPSY Right     AXILLARY   • NASAL SINUS SURGERY     • ROTATOR CUFF REPAIR Right    • TOTAL KNEE ARTHROPLASTY Left 2022    Procedure: LEFT TOTAL KNEE ARTHROPLASTY WITH CORI ROBOT;  Surgeon: Ant Ocasio II, MD;  [place] [facility] [place] [facility] [place]  Service: Robotics - Ortho;  Laterality: Left;      General Information     Row Name 22 1145          Physical Therapy Time and Intention    Document Type discharge evaluation/summary  Pt. is s/p Left TKR  -MS     Mode of Treatment physical therapy;individual therapy  -MS     Row Name 22 1145          General Information    Patient Profile Reviewed yes  -MS     Prior Level of Function independent:  -MS     Barriers to Rehab none identified  -MS     Row Name 22 1145          Cognition    Orientation Status (Cognition) oriented x 3  -MS     Row Name 22 1145          Safety Issues, Functional Mobility    Comment, Safety Issues/Impairments (Mobility) Gait belt used for safety.  -MS           User Key  (r) = Recorded By, (t) =  Taken By, (c) = Cosigned By    Initials Name Provider Type    Artur Woodson, PT Physical Therapist               Mobility     Row Name 12/27/22 1146          Bed Mobility    Bed Mobility supine-sit;sit-supine  -MS     Comment, (Bed Mobility) Up in chair this AM.  -MS     Row Name 12/27/22 1146          Sit-Stand Transfer    Sit-Stand [place] (Transfers) independent  -MS     Assistive Device (Sit-Stand Transfers) walker, front-wheeled  -MS     Row Name 12/27/22 1146          Gait/Stairs (Locomotion)    [place] Level (Gait) standby assist  -MS     Assistive Device (Gait) walker, front-wheeled  -MS     Distance in Feet (Gait) 100 feet  -MS     [place] Level (Stairs) stand by assist  -MS     Handrail Location (Stairs) left side (ascending)  -MS     Number of Steps (Stairs) 6  -MS     Ascending Technique (Stairs) step-to-step  -MS     Descending Technique (Stairs) step-to-step  -MS     Row Name 12/27/22 1146          Mobility    Extremity Weight-bearing Status left lower extremity  -MS     Left Lower Extremity (Weight-bearing Status) weight-bearing as tolerated (WBAT)  -MS           User Key  (r) = Recorded By, (t) = Taken By, (c) = Cosigned By    Initials Name Provider Type    Artur Woodson, PT Physical Therapist               Obj/Interventions     Row Name 12/27/22 1148          Range of Motion Comprehensive    Comment, General Range of Motion BUE/RLE (WFL's)  -MS     Row Name 12/27/22 1148          Strength Comprehensive (MMT)    Comment, General Manual Muscle Testing (MMT) Assessment BUE/RLE (>/= 3/5)  -MS     Row Name 12/27/22 1148          Motor Skills    Therapeutic Exercise --  Left TKR ther. ex. program x 10 reps completed  -MS           User Key  (r) = Recorded By, (t) = Taken By, (c) = Cosigned By    Initials Name Provider Type    Artur Woodson, PT Physical Therapist               Goals/Plan    No documentation.                Clinical Impression     Row Name 12/27/22 1148           Pain    Pretreatment Pain Rating 5/10  -MS     Posttreatment Pain Rating 5/10  -MS     Pain Location - Side/Orientation Left  -MS     Pain Location - knee  -MS     Pain Intervention(s) Medication (See MAR);Elevated;Nursing Notified;Cold applied;Repositioned  -MS     Row Name 12/27/22 1148          Positioning and Restraints    Pre-Treatment Position sitting in chair/recliner  -MS     Post Treatment Position chair  -MS     In Chair notified nsg;reclined;sitting;call light within reach;encouraged to call for assist;exit alarm on;with family/caregiver  -MS           User Key  (r) = Recorded By, (t) = Taken By, (c) = Cosigned By    Initials Name Provider Type    Artur Woodson, PT Physical Therapist               Outcome Measures     Row Name 12/27/22 1149 12/27/22 0811       How much help from another person do you currently need...    Turning from your back to your side while in flat bed without using bedrails? 4  -MS 4  -DELMA    Moving from lying on back to sitting on the side of a flat bed without bedrails? 4  -MS 4  -DELMA    Moving to and from a bed to a chair (including a wheelchair)? 4  -MS 3  -DELMA    Standing up from a chair using your arms (e.g., wheelchair, bedside chair)? 4  -MS 3  -DELMA    Climbing 3-5 steps with a railing? 3  -MS 3  -DELMA    To walk in hospital room? 3  -MS 3  -DELMA    AM-PAC 6 Clicks Score (PT) 22  -MS 20  -DELMA    Highest level of mobility 7 --> Walked 25 feet or more  -MS 6 --> Walked 10 steps or more  -DELMA    Row Name 12/27/22 1149          Functional Assessment    Outcome Measure Options AM-PAC 6 Clicks Basic Mobility (PT)  -MS           User Key  (r) = Recorded By, (t) = Taken By, (c) = Cosigned By    Initials Name Provider Type    Artur Woodson, PT Physical Therapist    Mer Navarro, RN Registered Nurse              Physical Therapy Education     Title: PT OT SLP Therapies (Resolved)     Topic: Physical Therapy (Resolved)     Point: Mobility training (Resolved)     Learning  Progress Summary           Patient Acceptance, E,D, VU,DU by MS at 12/27/2022 1149                   Point: Home exercise program (Resolved)     Learning Progress Summary           Patient Acceptance, E,D, VU,DU by MS at 12/27/2022 1149                   Point: Body mechanics (Resolved)     Learning Progress Summary           Patient Acceptance, E,D, VU,DU by MS at 12/27/2022 1149                   Point: Precautions (Resolved)     Learning Progress Summary           Patient Acceptance, E,D, VU,DU by MS at 12/27/2022 1149                               User Key     Initials Effective Dates Name Provider Type Discipline    MS 06/16/21 -  Artur Calderón, PT Physical Therapist PT              PT Recommendation and Plan     Plan of Care Reviewed With: patient  Outcome Evaluation: Pt. is currently independent/SBA with functional mobility and has no further questions/concerns regarding functional mobility or home safety.  Encouraged pt. to continue ther. ex. program 2-3 x's daily and to ambulate every 1-2 hours once home. Plan for discharge home this date.  Will sign off.     Time Calculation:    PT Charges     Row Name 12/27/22 1150             Time Calculation    Start Time 0900  -MS      Stop Time 0915  -MS      Time Calculation (min) 15 min  -MS      PT Received On 12/27/22  -MS         Time Calculation- PT    Total Timed Code Minutes- PT 14 minute(s)  -MS            User Key  (r) = Recorded By, (t) = Taken By, (c) = Cosigned By    Initials Name Provider Type    MS Artur Calderón PT Physical Therapist              Therapy Charges for Today     Code Description Service Date Service Provider Modifiers Qty    17549177103 HC PT EVAL LOW COMPLEXITY 1 12/27/2022 Artur Calderón, PT GP 1    42573597527 HC PT THER PROC EA 15 MIN 12/27/2022 Artur Calderón, PT GP 1          PT G-Codes  Outcome Measure Options: AM-PAC 6 Clicks Basic Mobility (PT)  AM-PAC 6 Clicks Score (PT): 22    PT Discharge Summary  Anticipated  Discharge Disposition (PT): home with assist, home with home health  Reason for Discharge: Discharge from facility  Discharge Destination: Home with assist, Home with home health    Artur Calderón, PT  12/27/2022

## 2022-12-28 NOTE — CASE MANAGEMENT/SOCIAL WORK
Case Management Discharge Note      Final Note: Tamie HODGSON.         Selected Continued Care - Discharged on 12/27/2022 Admission date: 12/26/2022 - Discharge disposition: Home or Self Care    Destination    No services have been selected for the patient.              Durable Medical Equipment    No services have been selected for the patient.              Dialysis/Infusion    No services have been selected for the patient.              Home Medical Care Coordination complete.    Service Provider Selected Services Address Phone Fax Patient Preferred    [facility] [address] [place] [id number] [phone] --          Therapy    No services have been selected for the patient.              Community Resources    No services have been selected for the patient.              Community & DME    No services have been selected for the patient.                       Final Discharge Disposition Code: 06 - home with home health care

## (undated) DEVICE — TBG PENCL TELESCP MEGADYNE SMOKE EVAC 10FT

## (undated) DEVICE — 450 ML BOTTLE OF 0.05% CHLORHEXIDINE GLUCONATE IN 99.95% STERILE WATER FOR IRRIGATION, USP AND APPLICATOR.: Brand: IRRISEPT ANTIMICROBIAL WOUND LAVAGE

## (undated) DEVICE — SUT ETHIB 2 CV V37 MS/4 30IN MX69G

## (undated) DEVICE — PK KN TOTL 40

## (undated) DEVICE — SOL NACL 0.9PCT 1000ML

## (undated) DEVICE — GLV SURG SIGNATURE ESSENTIAL PF LTX SZ8.5

## (undated) DEVICE — GLV SURG PREMIERPRO ORTHO LTX PF SZ8.5 BRN

## (undated) DEVICE — COVER,MAYO STAND,STERILE: Brand: MEDLINE

## (undated) DEVICE — SUCTION MAT (LOW PROFILE), 50X34: Brand: NEPTUNE

## (undated) DEVICE — SOL ISO/ALC RUB 70PCT 4OZ

## (undated) DEVICE — DISPOSABLE TOURNIQUET CUFF DUAL BLADDER, SINGLE PORT AND QUICK CONNECT CONNECTOR: Brand: COLOR CUFF

## (undated) DEVICE — Device

## (undated) DEVICE — APPL CHLORAPREP HI/LITE 26ML ORNG

## (undated) DEVICE — SUT ETHLN 2/0 PS 18IN 585H

## (undated) DEVICE — GENESIS TROCHLEAR PIN 1/8 X 3: Brand: GENESIS

## (undated) DEVICE — INTENDED FOR TISSUE SEPARATION, AND OTHER PROCEDURES THAT REQUIRE A SHARP SURGICAL BLADE TO PUNCTURE OR CUT.: Brand: BARD-PARKER ® CARBON RIB-BACK BLADES

## (undated) DEVICE — DUAL CUT SAGITTAL BLADE

## (undated) DEVICE — SYR LUERLOK 30CC

## (undated) DEVICE — DECANTER BAG 9": Brand: MEDLINE INDUSTRIES, INC.

## (undated) DEVICE — PREP SOL POVIDONE/IODINE BT 4OZ

## (undated) DEVICE — NEEDLE, QUINCKE, 20GX3.5": Brand: MEDLINE

## (undated) DEVICE — GLV SURG BIOGEL LTX PF 7

## (undated) DEVICE — TRAP FLD MINIVAC MEGADYNE 100ML

## (undated) DEVICE — GLV SURG SENSICARE PI MIC PF SZ7 LF STRL